# Patient Record
Sex: FEMALE | Race: OTHER
[De-identification: names, ages, dates, MRNs, and addresses within clinical notes are randomized per-mention and may not be internally consistent; named-entity substitution may affect disease eponyms.]

---

## 2018-03-26 ENCOUNTER — HOSPITAL ENCOUNTER (EMERGENCY)
Dept: HOSPITAL 46 - ED | Age: 38
Discharge: HOME | End: 2018-03-26
Payer: COMMERCIAL

## 2018-03-26 VITALS — HEIGHT: 62 IN | WEIGHT: 142.99 LBS | BODY MASS INDEX: 26.31 KG/M2

## 2018-03-26 DIAGNOSIS — Z79.899: ICD-10-CM

## 2018-03-26 DIAGNOSIS — M79.1: Primary | ICD-10-CM

## 2019-04-09 ENCOUNTER — HOSPITAL ENCOUNTER (EMERGENCY)
Dept: HOSPITAL 46 - ED | Age: 39
Discharge: HOME | End: 2019-04-09
Payer: COMMERCIAL

## 2019-04-09 VITALS — HEIGHT: 62 IN | BODY MASS INDEX: 26.31 KG/M2 | WEIGHT: 142.95 LBS

## 2019-04-09 DIAGNOSIS — Z00.8: Primary | ICD-10-CM

## 2019-04-09 NOTE — XMS
PreManage Notification: RANDA CERNA MRN:[id number]
 
Security Information
 
Security Events
No recent Security Events currently on file
 
 
 
CRITERIA MET
------------
[facility]
 
 
CARE PROVIDERS
There are no care providers on record at this time.
 
Karen has no Care Guidelines for this patient.
 
JAMES VISIT COUNT (12 MO.)
-------------------------------------------------------------------------------------
1 RUBEN Emerson
-------------------------------------------------------------------------------------
TOTAL 1
-------------------------------------------------------------------------------------
NOTE: Visits indicate total known visits.
 
ED/C VISIT TRACKING (12 MO.)
-------------------------------------------------------------------------------------
04/09/2019 17:50
RUBEN Giordano OR
 
TYPE: Emergency
 
COMPLAINT:
- RIGHT MIDDLE FINGER
-------------------------------------------------------------------------------------
 
 
INPATIENT VISIT TRACKING (12 MO.)
No inpatient visits to display in this time frame
 
[email or web address]